# Patient Record
Sex: MALE | Race: BLACK OR AFRICAN AMERICAN | NOT HISPANIC OR LATINO | Employment: FULL TIME | URBAN - METROPOLITAN AREA
[De-identification: names, ages, dates, MRNs, and addresses within clinical notes are randomized per-mention and may not be internally consistent; named-entity substitution may affect disease eponyms.]

---

## 2024-05-08 ENCOUNTER — HOSPITAL ENCOUNTER (EMERGENCY)
Facility: CLINIC | Age: 44
Discharge: HOME | End: 2024-05-08
Attending: PHYSICIAN ASSISTANT | Admitting: PHYSICIAN ASSISTANT

## 2024-05-08 ENCOUNTER — APPOINTMENT (OUTPATIENT)
Dept: CT IMAGING | Facility: CLINIC | Age: 44
End: 2024-05-08
Attending: EMERGENCY MEDICINE

## 2024-05-08 ENCOUNTER — APPOINTMENT (OUTPATIENT)
Dept: GENERAL RADIOLOGY | Facility: CLINIC | Age: 44
End: 2024-05-08
Attending: PHYSICIAN ASSISTANT

## 2024-05-08 VITALS
HEIGHT: 68 IN | HEART RATE: 85 BPM | WEIGHT: 158.07 LBS | BODY MASS INDEX: 23.96 KG/M2 | TEMPERATURE: 99 F | RESPIRATION RATE: 18 BRPM | OXYGEN SATURATION: 100 % | SYSTOLIC BLOOD PRESSURE: 141 MMHG | DIASTOLIC BLOOD PRESSURE: 85 MMHG

## 2024-05-08 DIAGNOSIS — F10.90 ALCOHOL USE DISORDER: ICD-10-CM

## 2024-05-08 DIAGNOSIS — K92.0 HEMATEMESIS WITH NAUSEA: ICD-10-CM

## 2024-05-08 DIAGNOSIS — R10.13 ABDOMINAL PAIN, EPIGASTRIC: ICD-10-CM

## 2024-05-08 LAB
ABO/RH(D): NORMAL
ALBUMIN SERPL BCG-MCNC: 3.9 G/DL (ref 3.5–5.2)
ALP SERPL-CCNC: 66 U/L (ref 40–150)
ALT SERPL W P-5'-P-CCNC: 36 U/L (ref 0–70)
ANION GAP SERPL CALCULATED.3IONS-SCNC: 18 MMOL/L (ref 7–15)
ANTIBODY SCREEN: NEGATIVE
APTT PPP: 29 SECONDS (ref 22–38)
AST SERPL W P-5'-P-CCNC: 233 U/L (ref 0–45)
ATRIAL RATE - MUSE: 90 BPM
BASOPHILS # BLD AUTO: 0.1 10E3/UL (ref 0–0.2)
BASOPHILS NFR BLD AUTO: 1 %
BILIRUB SERPL-MCNC: 1.5 MG/DL
BUN SERPL-MCNC: 12.9 MG/DL (ref 6–20)
CALCIUM SERPL-MCNC: 8.5 MG/DL (ref 8.6–10)
CHLORIDE SERPL-SCNC: 94 MMOL/L (ref 98–107)
CREAT SERPL-MCNC: 0.71 MG/DL (ref 0.67–1.17)
DEPRECATED HCO3 PLAS-SCNC: 22 MMOL/L (ref 22–29)
DIASTOLIC BLOOD PRESSURE - MUSE: NORMAL MMHG
EGFRCR SERPLBLD CKD-EPI 2021: >90 ML/MIN/1.73M2
EOSINOPHIL # BLD AUTO: 0.1 10E3/UL (ref 0–0.7)
EOSINOPHIL NFR BLD AUTO: 1 %
ERYTHROCYTE [DISTWIDTH] IN BLOOD BY AUTOMATED COUNT: 13 % (ref 10–15)
ETHANOL SERPL-MCNC: <0.01 G/DL
GLUCOSE BLDC GLUCOMTR-MCNC: 204 MG/DL (ref 70–99)
GLUCOSE SERPL-MCNC: 169 MG/DL (ref 70–99)
HCT VFR BLD AUTO: 37.9 % (ref 40–53)
HGB BLD-MCNC: 13.2 G/DL (ref 13.3–17.7)
IMM GRANULOCYTES # BLD: 0 10E3/UL
IMM GRANULOCYTES NFR BLD: 0 %
INR PPP: 1.03 (ref 0.85–1.15)
INTERPRETATION ECG - MUSE: NORMAL
LIPASE SERPL-CCNC: 53 U/L (ref 13–60)
LYMPHOCYTES # BLD AUTO: 0.6 10E3/UL (ref 0.8–5.3)
LYMPHOCYTES NFR BLD AUTO: 8 %
MCH RBC QN AUTO: 29.9 PG (ref 26.5–33)
MCHC RBC AUTO-ENTMCNC: 34.8 G/DL (ref 31.5–36.5)
MCV RBC AUTO: 86 FL (ref 78–100)
MONOCYTES # BLD AUTO: 0.7 10E3/UL (ref 0–1.3)
MONOCYTES NFR BLD AUTO: 8 %
NEUTROPHILS # BLD AUTO: 6.3 10E3/UL (ref 1.6–8.3)
NEUTROPHILS NFR BLD AUTO: 82 %
NRBC # BLD AUTO: 0 10E3/UL
NRBC BLD AUTO-RTO: 0 /100
P AXIS - MUSE: 39 DEGREES
PLATELET # BLD AUTO: 144 10E3/UL (ref 150–450)
POTASSIUM SERPL-SCNC: 4 MMOL/L (ref 3.4–5.3)
PR INTERVAL - MUSE: 170 MS
PROT SERPL-MCNC: 7.4 G/DL (ref 6.4–8.3)
QRS DURATION - MUSE: 84 MS
QT - MUSE: 366 MS
QTC - MUSE: 447 MS
R AXIS - MUSE: 38 DEGREES
RBC # BLD AUTO: 4.41 10E6/UL (ref 4.4–5.9)
SODIUM SERPL-SCNC: 134 MMOL/L (ref 135–145)
SPECIMEN EXPIRATION DATE: NORMAL
SYSTOLIC BLOOD PRESSURE - MUSE: NORMAL MMHG
T AXIS - MUSE: 36 DEGREES
TROPONIN T SERPL HS-MCNC: 9 NG/L
VENTRICULAR RATE- MUSE: 90 BPM
WBC # BLD AUTO: 7.8 10E3/UL (ref 4–11)

## 2024-05-08 PROCEDURE — 258N000003 HC RX IP 258 OP 636: Performed by: PHYSICIAN ASSISTANT

## 2024-05-08 PROCEDURE — 83690 ASSAY OF LIPASE: CPT | Performed by: PHYSICIAN ASSISTANT

## 2024-05-08 PROCEDURE — 96361 HYDRATE IV INFUSION ADD-ON: CPT

## 2024-05-08 PROCEDURE — 93005 ELECTROCARDIOGRAM TRACING: CPT

## 2024-05-08 PROCEDURE — 80053 COMPREHEN METABOLIC PANEL: CPT | Performed by: PHYSICIAN ASSISTANT

## 2024-05-08 PROCEDURE — 74174 CTA ABD&PLVS W/CONTRAST: CPT

## 2024-05-08 PROCEDURE — 96374 THER/PROPH/DIAG INJ IV PUSH: CPT | Mod: 59

## 2024-05-08 PROCEDURE — 36415 COLL VENOUS BLD VENIPUNCTURE: CPT | Performed by: EMERGENCY MEDICINE

## 2024-05-08 PROCEDURE — 82077 ASSAY SPEC XCP UR&BREATH IA: CPT | Performed by: PHYSICIAN ASSISTANT

## 2024-05-08 PROCEDURE — 250N000009 HC RX 250: Performed by: PHYSICIAN ASSISTANT

## 2024-05-08 PROCEDURE — 85610 PROTHROMBIN TIME: CPT | Performed by: EMERGENCY MEDICINE

## 2024-05-08 PROCEDURE — 250N000011 HC RX IP 250 OP 636: Performed by: PHYSICIAN ASSISTANT

## 2024-05-08 PROCEDURE — 86900 BLOOD TYPING SEROLOGIC ABO: CPT | Performed by: PHYSICIAN ASSISTANT

## 2024-05-08 PROCEDURE — 36415 COLL VENOUS BLD VENIPUNCTURE: CPT | Performed by: PHYSICIAN ASSISTANT

## 2024-05-08 PROCEDURE — C9113 INJ PANTOPRAZOLE SODIUM, VIA: HCPCS | Performed by: PHYSICIAN ASSISTANT

## 2024-05-08 PROCEDURE — 84484 ASSAY OF TROPONIN QUANT: CPT | Performed by: PHYSICIAN ASSISTANT

## 2024-05-08 PROCEDURE — 82962 GLUCOSE BLOOD TEST: CPT

## 2024-05-08 PROCEDURE — 93005 ELECTROCARDIOGRAM TRACING: CPT | Mod: 76

## 2024-05-08 PROCEDURE — 85004 AUTOMATED DIFF WBC COUNT: CPT | Performed by: PHYSICIAN ASSISTANT

## 2024-05-08 PROCEDURE — 99285 EMERGENCY DEPT VISIT HI MDM: CPT | Mod: 25

## 2024-05-08 PROCEDURE — 85730 THROMBOPLASTIN TIME PARTIAL: CPT | Performed by: EMERGENCY MEDICINE

## 2024-05-08 PROCEDURE — 96375 TX/PRO/DX INJ NEW DRUG ADDON: CPT

## 2024-05-08 PROCEDURE — 71046 X-RAY EXAM CHEST 2 VIEWS: CPT

## 2024-05-08 RX ORDER — OMEPRAZOLE 40 MG/1
40 CAPSULE, DELAYED RELEASE ORAL DAILY
Qty: 14 CAPSULE | Refills: 0 | Status: SHIPPED | OUTPATIENT
Start: 2024-05-08 | End: 2024-05-22

## 2024-05-08 RX ORDER — ONDANSETRON 2 MG/ML
4 INJECTION INTRAMUSCULAR; INTRAVENOUS ONCE
Status: COMPLETED | OUTPATIENT
Start: 2024-05-08 | End: 2024-05-08

## 2024-05-08 RX ORDER — FOLIC ACID 5 MG/ML
1 INJECTION, SOLUTION INTRAMUSCULAR; INTRAVENOUS; SUBCUTANEOUS ONCE
Status: COMPLETED | OUTPATIENT
Start: 2024-05-08 | End: 2024-05-08

## 2024-05-08 RX ORDER — THIAMINE HYDROCHLORIDE 100 MG/ML
100 INJECTION, SOLUTION INTRAMUSCULAR; INTRAVENOUS DAILY
Status: DISCONTINUED | OUTPATIENT
Start: 2024-05-08 | End: 2024-05-08 | Stop reason: HOSPADM

## 2024-05-08 RX ORDER — IOPAMIDOL 755 MG/ML
500 INJECTION, SOLUTION INTRAVASCULAR ONCE
Status: COMPLETED | OUTPATIENT
Start: 2024-05-08 | End: 2024-05-08

## 2024-05-08 RX ORDER — ONDANSETRON 4 MG/1
4 TABLET, ORALLY DISINTEGRATING ORAL EVERY 8 HOURS PRN
Qty: 12 TABLET | Refills: 0 | Status: SHIPPED | OUTPATIENT
Start: 2024-05-08

## 2024-05-08 RX ORDER — HYDROMORPHONE HYDROCHLORIDE 1 MG/ML
0.5 INJECTION, SOLUTION INTRAMUSCULAR; INTRAVENOUS; SUBCUTANEOUS ONCE
Status: COMPLETED | OUTPATIENT
Start: 2024-05-08 | End: 2024-05-08

## 2024-05-08 RX ADMIN — PANTOPRAZOLE SODIUM 40 MG: 40 INJECTION, POWDER, FOR SOLUTION INTRAVENOUS at 11:41

## 2024-05-08 RX ADMIN — IOPAMIDOL 72 ML: 755 INJECTION, SOLUTION INTRAVENOUS at 12:30

## 2024-05-08 RX ADMIN — SODIUM CHLORIDE 100 ML: 9 INJECTION, SOLUTION INTRAVENOUS at 12:30

## 2024-05-08 RX ADMIN — FOLIC ACID 1 MG: 5 INJECTION, SOLUTION INTRAMUSCULAR; INTRAVENOUS; SUBCUTANEOUS at 11:43

## 2024-05-08 RX ADMIN — THIAMINE HYDROCHLORIDE 100 MG: 100 INJECTION, SOLUTION INTRAMUSCULAR; INTRAVENOUS at 11:45

## 2024-05-08 RX ADMIN — SODIUM CHLORIDE 1000 ML: 9 INJECTION, SOLUTION INTRAVENOUS at 11:37

## 2024-05-08 RX ADMIN — ONDANSETRON 4 MG: 2 INJECTION INTRAMUSCULAR; INTRAVENOUS at 11:39

## 2024-05-08 RX ADMIN — HYDROMORPHONE HYDROCHLORIDE 0.5 MG: 1 INJECTION, SOLUTION INTRAMUSCULAR; INTRAVENOUS; SUBCUTANEOUS at 11:46

## 2024-05-08 ASSESSMENT — ACTIVITIES OF DAILY LIVING (ADL)
ADLS_ACUITY_SCORE: 35
ADLS_ACUITY_SCORE: 35
ADLS_ACUITY_SCORE: 33
ADLS_ACUITY_SCORE: 35
ADLS_ACUITY_SCORE: 35

## 2024-05-08 ASSESSMENT — COLUMBIA-SUICIDE SEVERITY RATING SCALE - C-SSRS
2. HAVE YOU ACTUALLY HAD ANY THOUGHTS OF KILLING YOURSELF IN THE PAST MONTH?: NO
1. IN THE PAST MONTH, HAVE YOU WISHED YOU WERE DEAD OR WISHED YOU COULD GO TO SLEEP AND NOT WAKE UP?: NO
6. HAVE YOU EVER DONE ANYTHING, STARTED TO DO ANYTHING, OR PREPARED TO DO ANYTHING TO END YOUR LIFE?: NO

## 2024-05-08 NOTE — ED TRIAGE NOTES
Pt presents to the ED with spouse stating pt had a few episodes of bloody vomit this morning and a few episodes last week. Pt also complains of left sided abd pain 6/10, and he has had elio horses in his calves frequently. He also states he has been SOB since vomiting this morning. Pt endorses drinking alcohol regularly, and states his last drink was 1900 yesterday.          Yes, I see an EMG from February 20, 2019. We can mail copy or she can pick it up.

## 2024-05-08 NOTE — ED PROVIDER NOTES
"  Emergency Department Note      History of Present Illness     Chief Complaint  Hematemesis    HPI  Guem Ribeiro is a 43 year old male with a history of diabetes who presents to the ED with his wife for evaluation of hematemesis. Patient reports onset of vomiting 3 weeks ago which initially resolved until yesterday. He states that the hematemesis began earlier today, initially appearing as \"specks\" and then becoming \"blobs of blood.\" He reports persistent left sided abdominal pain for about a month and a half. Gume also notes shortness of breath after vomiting this morning and states that the abdominal pain comes and goes, not exacerbated with eating. Endorses daily alcohol use, generally 4 or 5 shots and 3 beers. Denies fever, diarrhea, melena, or chest pain. No history of pancreatitis, gastroenteritis, or abdominal surgeries. No history of alcohol withdrawal or withdrawal seizure. Of note, patient does not check his blood sugars at home. He verbalized wanting to quit drinking but has not tried detoxing before.         Independent Historian  None as detailed above.    Review of External Notes  None  Past Medical History   Medical History and Problem List  Diabetes mellitus     Medications  Aspirin 81 mg   Atorvastatin   Metformin    Surgical History   Head surgery   Physical Exam   Patient Vitals for the past 24 hrs:   BP Temp Temp src Pulse Resp SpO2 Height Weight   05/08/24 1533 (!) 141/85 -- -- 85 18 100 % -- --   05/08/24 1506 (!) 142/93 -- -- -- -- -- -- --   05/08/24 1451 133/87 -- -- -- -- -- -- --   05/08/24 1430 (!) 142/95 -- -- 92 -- -- -- --   05/08/24 1420 131/85 -- -- -- -- -- -- --   05/08/24 1405 125/83 -- -- -- -- -- -- --   05/08/24 1350 132/87 -- -- -- -- -- -- --   05/08/24 1338 -- -- -- -- -- 100 % -- --   05/08/24 1335 -- -- -- -- -- 100 % -- --   05/08/24 1328 (!) 142/90 -- -- -- -- 100 % -- --   05/08/24 1320 -- -- -- -- -- 100 % -- --   05/08/24 1318 128/82 -- -- 86 -- 90 % -- -- " [facility] [employer] [facility] [facility] [employer] Child [employer] Examination       Student's Name  Madison Lose Birth "  05/08/24 1308 -- -- -- -- -- 100 % -- --   05/08/24 1258 (!) 146/83 -- -- -- -- 100 % -- --   05/08/24 1248 -- -- -- -- -- 99 % -- --   05/08/24 1228 -- -- -- 90 -- 100 % -- --   05/08/24 1218 (!) 135/114 -- -- 108 -- -- -- --   05/08/24 1053 (!) 129/93 99  F (37.2  C) Oral 99 20 99 % 1.727 m (5' 8\") 71.7 kg (158 lb 1.1 oz)     Physical Exam  Vitals and nursing note reviewed.   Constitutional:       Appearance: He is not diaphoretic.   HENT:      Mouth/Throat:      Mouth: Mucous membranes are moist.      Pharynx: Oropharynx is clear.   Eyes:      General: No scleral icterus.     Conjunctiva/sclera: Conjunctivae normal.   Cardiovascular:      Rate and Rhythm: Normal rate and regular rhythm.      Heart sounds: Normal heart sounds. No murmur heard.     No friction rub. No gallop.   Pulmonary:      Effort: No respiratory distress.      Breath sounds: No stridor. No wheezing, rhonchi or rales.   Abdominal:      General: There is no distension.      Palpations: Abdomen is soft. There is no hepatomegaly, splenomegaly or pulsatile mass.      Tenderness: There is abdominal tenderness in the epigastric area and left upper quadrant. There is no guarding or rebound.   Genitourinary:     Comments: Declined Rectal Exam  Skin:     General: Skin is warm.      Capillary Refill: Capillary refill takes less than 2 seconds.      Coloration: Skin is not jaundiced or pale.   Neurological:      Mental Status: He is alert and oriented to person, place, and time. Mental status is at baseline.   Psychiatric:         Attention and Perception: Attention and perception normal.         Mood and Affect: Mood and affect normal.         Behavior: Behavior normal.         Thought Content: Thought content normal.         Diagnostics   Lab Results   Labs Ordered and Resulted from Time of ED Arrival to Time of ED Departure   COMPREHENSIVE METABOLIC PANEL - Abnormal       Result Value    Sodium 134 (*)     Potassium 4.0      Carbon Dioxide (CO2) 22   " Title   DO    Date  12/21/2020   Signature                                                                                                                                              Title                           Date    (If adding dates to the above im    Anion Gap 18 (*)     Urea Nitrogen 12.9      Creatinine 0.71      GFR Estimate >90      Calcium 8.5 (*)     Chloride 94 (*)     Glucose 169 (*)     Alkaline Phosphatase 66       (*)     ALT 36      Protein Total 7.4      Albumin 3.9      Bilirubin Total 1.5 (*)    GLUCOSE BY METER - Abnormal    GLUCOSE BY METER POCT 204 (*)    CBC WITH PLATELETS AND DIFFERENTIAL - Abnormal    WBC Count 7.8      RBC Count 4.41      Hemoglobin 13.2 (*)     Hematocrit 37.9 (*)     MCV 86      MCH 29.9      MCHC 34.8      RDW 13.0      Platelet Count 144 (*)     % Neutrophils 82      % Lymphocytes 8      % Monocytes 8      % Eosinophils 1      % Basophils 1      % Immature Granulocytes 0      NRBCs per 100 WBC 0      Absolute Neutrophils 6.3      Absolute Lymphocytes 0.6 (*)     Absolute Monocytes 0.7      Absolute Eosinophils 0.1      Absolute Basophils 0.1      Absolute Immature Granulocytes 0.0      Absolute NRBCs 0.0     LIPASE - Normal    Lipase 53     ETHYL ALCOHOL LEVEL - Normal    Alcohol ethyl <0.01     TROPONIN T, HIGH SENSITIVITY - Normal    Troponin T, High Sensitivity 9     INR - Normal    INR 1.03     PARTIAL THROMBOPLASTIN TIME - Normal    aPTT 29     GLUCOSE MONITOR NURSING POCT   TYPE AND SCREEN, ADULT    ABO/RH(D) O POS      Antibody Screen Negative      SPECIMEN EXPIRATION DATE [id number]     ABO/RH TYPE AND SCREEN       Imaging  Chest XR,  PA & LAT   Final Result   IMPRESSION:  There are no acute infiltrates. The cardiac silhouette is   not enlarged. Pulmonary vasculature is unremarkable.       AMANDA YANEZ MD            SYSTEM ID:  [id number]      CTA Abdomen Pelvis with Contrast   Final Result   IMPRESSION:   1. No active GI bleeding.   2. Hepatic steatosis and hepatomegaly.   3. Patent abdominal aorta and visceral vessels.      JUAN J GRIFFITH DO            SYSTEM ID:  [id number]          EKG   This ECG was taken at 1117, and signed at 1139  Normal sinus rhythm   Normal ECG  Vent. rate 88, LA  ALLERGIES  (Food, drug, insect, other)  Amoxicillin MEDICATION  (List all prescribed or taken on a regular basis.)     Diagnosis of asthma?   Child wakes during the night coughing   Yes   No    Yes   No    Loss of function of one of paired organs? (eye/ear/ Signs of Insulin Resistance (hypertension, dyslipidemia, polycystic ovarian syndrome, acanthosis nigricans)    No           At Risk  No   Lead Risk Questionnaire  Req'd for children 6 months thru 6 yrs enrolled in licensed or public school operated day car interval 174, QRS Duration 86, QT/QTc 362/438, P-R-T axes 24 30 32    Independent Interpretation  Chest x-ray no effusion, infiltrate, pneumothorax  ED Course    Medications Administered  Medications   thiamine (B-1) injection 100 mg (100 mg Intravenous $Given 5/8/24 1145)   sodium chloride 0.9 % bag 100 mL for CT scan flush use (100 mLs As instructed $Given 5/8/24 1230)   sodium chloride 0.9% BOLUS 1,000 mL (0 mLs Intravenous Stopped 5/8/24 1533)   ondansetron (ZOFRAN) injection 4 mg (4 mg Intravenous $Given 5/8/24 1139)   folic acid injection 1 mg (1 mg Intravenous $Given 5/8/24 1143)   HYDROmorphone (PF) (DILAUDID) injection 0.5 mg (0.5 mg Intravenous $Given 5/8/24 1146)   pantoprazole (PROTONIX) IV push injection 40 mg (40 mg Intravenous $Given 5/8/24 1141)   iopamidol (ISOVUE-370) solution 500 mL (72 mLs Intravenous $Given 5/8/24 1230)       Procedures  Procedures     Discussion of Management      Social Determinants of Health adding to complexity of care  Alcohol use     ED Course  ED Course as of 05/08/24 1646   Wed May 08, 2024   1104 I obtained patient history and performed a physical exam.      Medical Decision Making / Diagnosis   CMS Diagnoses: None    MIPS     None    [facility]  Gume Ribeiro is a 43 year old male with acute onset of vomiting and upper abdominal pain is epigastric left-sided abdomen in the setting of daily drinking.  Patient is concerned because of small amounts of blood and clot in his emesis today.  CT imaging was obtained showing no evidence of pancreatitis cholecystitis biliary disease or GI bleeding.  Patient has no detectable ethanol levels at this time.  Patient has slight hyponatremia which IV fluids were given.  Notable mild elevation of AST and slight elevation of bili.  Again no evidence of biliary obstruction.  Patient is notable diabetic this not follow his glucose consistently 204 today.  Has no anion gap low concern for ketoacidosis.  White blood cell count normal hemoglobin  is stable.  Patient was given IV fluids also fortified vitamins.  There is no evidence of withdrawal.  Patient declined rectal exam.  Since this shift for possible malleable or wife's care in the setting of vomiting.  It could have PUD or alcoholic gastritis as a likely cause.  Symptoms resolved with interventions.  Patient was tolerating p.o. and vitals were reassuring prior to discharge.  Instructed patient to stop drinking alcohol and encourage help and resources.  Offered addiction referral however patient declined was not interested in this and prefers to stop on his own.  Patient has been placed for GI referral for further investigation may require upper endoscopy.  Will start him on PPI, antinausea medication.  Instructed the patient that if he develops worsening abdominal pain vomiting hemoptysis bloody stools melena fever or worsening condition to return back to the emergency department.  Patient will discharge at this time with his wife.    Disposition  The patient was discharged.     ICD-10 Codes:    ICD-10-CM    1. Abdominal pain, epigastric  R10.13 Adult GI  Referral - Consult Only      2. Hematemesis with nausea  K92.0 Adult GI  Referral - Consult Only      3. Alcohol use disorder  F10.90            Discharge Medications  Discharge Medication List as of 5/8/2024  3:33 PM        START taking these medications    Details   omeprazole (PRILOSEC) 40 MG DR capsule Take 1 capsule (40 mg) by mouth daily for 14 days, Disp-14 capsule, R-0, Local Print      ondansetron (ZOFRAN ODT) 4 MG ODT tab Take 1 tablet (4 mg) by mouth every 8 hours as needed for vomiting or nausea, Disp-12 tablet, R-0, Local Print               Scribe Disclosure:  MADONNA, Janine Lr, am serving as a scribe at 11:29 AM on 5/8/2024 to document services personally performed by Lv Duran PA-C based on my observations and the provider's statements to me.     [employer]      Renee  required in the school setting  None DIETARY Needs/Restrictions     None   SPECIAL INSTRUCTIONS/DEVICES e.g. safety glasses, glass eye, chest protector for arrhythmia, pacemaker, prosthetic device, dental bridge, false teeth, athleticsupport/cup     None Lv QIU PA-C  05/08/24 8911

## 2024-05-08 NOTE — ED PROVIDER NOTES
ED ATTENDING PHYSICIAN NOTE:   I evaluated this patient in conjunction with Lv Duran PA-C  I have participated in the care of the patient and personally performed key elements of the history, exam, and medical decision making.      HPI:   Gume Ribeiro is a 43 year old male who presents with hematemesis. The patient states that a few weeks ago he had an episode where he was vomiting blood but this went away on its own and he never sought after medical care at that time. Then this morning he developed hematemesis and a brief episode of shortness of breath. He states that today the blood in his vomit came out in clots whereas three weeks ago the blood was in streaks. He denies any diarrhea or melena. Patient is a chronic drinker and drinks 8-12 total drinks a day. Of note patient has had abdominal pain for the past 1.5 months in his left side. Patient has had no history of varices of liver issues in the past. No history of GI bleeds and no endoscopy in the past. Patient denies any alcohol today. Patient is not on blood thinners.    Independent Historian:   None    Review of External Notes: None      EXAM:   General: Laying on the ED bed  HEENT: Normocephalic, atraumatic  Cardiac: Warm and well perfused, regular rate and rhythm  Pulm: Breathing comfortably, no accessory muscle usage, no conversational dyspnea  GI: Abdomen soft, tender to palpation in the epigastrium and left upper quadrant, no rigidity or guarding  MSK: No bony deformities  Skin: Warm and dry  Neuro: Moves all extremities  Psych: Pleasant mood and affect    This ECG was taken at 1117, and signed at 1139  Normal sinus rhythm   Normal ECG  Vent. rate 88, NE interval 174, QRS Duration 86, QT/QTc 362/438, P-R-T axes 24 30 32    Chest XR,  PA & LAT   Final Result   IMPRESSION:  There are no acute infiltrates. The cardiac silhouette is   not enlarged. Pulmonary vasculature is unremarkable.       AMANDA YANEZ MD            SYSTEM ID:  [id number]       CTA Abdomen Pelvis with Contrast   Final Result   IMPRESSION:   1. No active GI bleeding.   2. Hepatic steatosis and hepatomegaly.   3. Patent abdominal aorta and visceral vessels.      JUAN J GRIFFITH DO            SYSTEM ID:  [id number]        Labs Ordered and Resulted from Time of ED Arrival to Time of ED Departure   COMPREHENSIVE METABOLIC PANEL - Abnormal       Result Value    Sodium 134 (*)     Potassium 4.0      Carbon Dioxide (CO2) 22      Anion Gap 18 (*)     Urea Nitrogen 12.9      Creatinine 0.71      GFR Estimate >90      Calcium 8.5 (*)     Chloride 94 (*)     Glucose 169 (*)     Alkaline Phosphatase 66       (*)     ALT 36      Protein Total 7.4      Albumin 3.9      Bilirubin Total 1.5 (*)    GLUCOSE BY METER - Abnormal    GLUCOSE BY METER POCT 204 (*)    CBC WITH PLATELETS AND DIFFERENTIAL - Abnormal    WBC Count 7.8      RBC Count 4.41      Hemoglobin 13.2 (*)     Hematocrit 37.9 (*)     MCV 86      MCH 29.9      MCHC 34.8      RDW 13.0      Platelet Count 144 (*)     % Neutrophils 82      % Lymphocytes 8      % Monocytes 8      % Eosinophils 1      % Basophils 1      % Immature Granulocytes 0      NRBCs per 100 WBC 0      Absolute Neutrophils 6.3      Absolute Lymphocytes 0.6 (*)     Absolute Monocytes 0.7      Absolute Eosinophils 0.1      Absolute Basophils 0.1      Absolute Immature Granulocytes 0.0      Absolute NRBCs 0.0     LIPASE - Normal    Lipase 53     ETHYL ALCOHOL LEVEL - Normal    Alcohol ethyl <0.01     TROPONIN T, HIGH SENSITIVITY - Normal    Troponin T, High Sensitivity 9     INR - Normal    INR 1.03     PARTIAL THROMBOPLASTIN TIME - Normal    aPTT 29     GLUCOSE MONITOR NURSING POCT   TYPE AND SCREEN, ADULT    ABO/RH(D) O POS      Antibody Screen Negative      SPECIMEN EXPIRATION DATE [id number]     ABO/RH TYPE AND SCREEN       Independent Interpretation (X-rays, CTs, rhythm strip):  Chest x-ray on my review is clear without lobar infiltrate, pneumothorax, large  pleural effusion, or significant edema.     Consultations/Discussion of Management or Tests:  None    Social Determinants of Health affecting care:   Healthcare Access/Compliance     MEDICAL DECISION MAKING/ASSESSMENT AND PLAN:   43-year-old male with history of alcohol abuse presents with abdominal pain and an episode of hematemesis this morning.  This is the second episode of noting blood in his vomit, first 1 being 3 weeks ago which resolved on its own.  No known history of cirrhosis or varices, though it seems this has not been evaluated before.  Borderline tachycardic here.  Lab work shows an AST elevation consistent with alcohol use.  No acute anemia.  The patient declined rectal exam today.  A CTA abdomen/pelvis GI bleed protocol was obtained and shows no active extravasation, no other emergent findings.  The patient had no more episodes of vomiting in the ED. he passed his p.o. challenge.  Plan is for discharge home with PCP and GI follow-up for further care, recommendation for alcohol abstinence.      DIAGNOSIS:     ICD-10-CM    1. Abdominal pain, epigastric  R10.13 Adult GI  Referral - Consult Only      2. Hematemesis with nausea  K92.0 Adult GI  Referral - Consult Only      3. Alcohol use disorder  F10.90           DISPOSITION:   The patient was discharged home.     Scribe Disclosure:  Nacho PEACOCK, am serving as a scribe at 11:18 AM on 5/8/2024 to document services personally performed by Isidoro Manriquez MD based on my observations and the provider's statements to me.     5/8/2024  [facility] EMERGENCY DEPT       Isidoro Manriquez MD  05/08/24 [id number]

## 2024-05-08 NOTE — DISCHARGE INSTRUCTIONS
You need to stop drinking alcohol.  You should take your prescribed medications as instructed.  You should follow-up closely with gastroenterology for potential upper endoscopy testing.

## 2024-05-09 LAB
ATRIAL RATE - MUSE: 88 BPM
DIASTOLIC BLOOD PRESSURE - MUSE: NORMAL MMHG
INTERPRETATION ECG - MUSE: NORMAL
P AXIS - MUSE: 24 DEGREES
PR INTERVAL - MUSE: 174 MS
QRS DURATION - MUSE: 86 MS
QT - MUSE: 362 MS
QTC - MUSE: 438 MS
R AXIS - MUSE: 30 DEGREES
SYSTOLIC BLOOD PRESSURE - MUSE: NORMAL MMHG
T AXIS - MUSE: 32 DEGREES
VENTRICULAR RATE- MUSE: 88 BPM